# Patient Record
Sex: FEMALE | Race: WHITE | NOT HISPANIC OR LATINO | Employment: PART TIME | URBAN - METROPOLITAN AREA
[De-identification: names, ages, dates, MRNs, and addresses within clinical notes are randomized per-mention and may not be internally consistent; named-entity substitution may affect disease eponyms.]

---

## 2017-08-22 ENCOUNTER — TELEPHONE (OUTPATIENT)
Dept: CARDIAC SURGERY | Facility: CLINIC | Age: 67
End: 2017-08-22

## 2017-08-22 NOTE — TELEPHONE ENCOUNTER
PT CALLED REGARDING RECALL LETTER, 1 YEAR F/U WITH DR. PENDLETON. PT WOULD LIKE APPT IN [place] AS EARLY AS POSSIBLY IN THE MORNING. VERIFIED PT'S ADDRESS, PHONE NUMBER AND INSURANCE.

## 2017-08-28 DIAGNOSIS — I65.23 CAROTID STENOSIS, ASYMPTOMATIC, BILATERAL: ICD-10-CM

## 2017-08-28 DIAGNOSIS — I65.23 CAROTID STENOSIS, BILATERAL: Primary | ICD-10-CM

## 2017-10-05 ENCOUNTER — OFFICE VISIT (OUTPATIENT)
Dept: CARDIAC SURGERY | Facility: CLINIC | Age: 67
End: 2017-10-05

## 2017-10-05 DIAGNOSIS — I65.23 BILATERAL CAROTID ARTERY STENOSIS: Primary | ICD-10-CM

## 2017-10-05 PROCEDURE — 99212 OFFICE O/P EST SF 10 MIN: CPT | Performed by: THORACIC SURGERY (CARDIOTHORACIC VASCULAR SURGERY)

## 2017-10-06 VITALS
WEIGHT: 250 LBS | DIASTOLIC BLOOD PRESSURE: 71 MMHG | BODY MASS INDEX: 39.24 KG/M2 | SYSTOLIC BLOOD PRESSURE: 156 MMHG | HEIGHT: 67 IN

## 2017-10-06 NOTE — PROGRESS NOTES
10/05/2017  Patient Information  Marcela Peter                                                                                          [address] [address]   1950  'PCP/Referring Physician'  Macie Juares MD  [phone]  No ref. provider found    Chief Complaint   Patient presents with   • Follow-up     1yr f/u w/ carotid duplex       History of Present Illness:  Mrs. Peter returns today for follow up.  She has overall been doing reasonably well since the last visit.  She denies any TIA or CVA type of symptoms.  She denies any dizziness or vertigo.    Patient Active Problem List   Diagnosis   • CAD (coronary artery disease)   • Diabetes mellitus   • Dyslipidemia   • Glaucoma   • Hypertension   • Carotid stenosis   • Bilateral carotid artery stenosis     Past Medical History:   Diagnosis Date   • Carotid stenosis    • Diabetes mellitus 8/25/2016   • Dyslipidemia 8/25/2016   • Glaucoma 8/25/2016   • Hypertension 8/25/2016     Past Surgical History:   Procedure Laterality Date   • CHOLECYSTECTOMY         Current Outpatient Prescriptions:   •  amLODIPine (NORVASC) 10 MG tablet, , Disp: , Rfl: 0  •  aspirin 325 MG tablet, Take  by mouth., Disp: , Rfl:   •  atorvastatin (LIPITOR) 40 MG tablet, , Disp: , Rfl:   •  cetirizine (ZyrTEC) 10 MG tablet, Take 10 mg by mouth daily., Disp: , Rfl:   •  citalopram (CELEXA) 40 MG tablet, Take  by mouth., Disp: , Rfl:   •  clopidogrel (PLAVIX) 75 MG tablet, Take  by mouth., Disp: , Rfl:   •  dexlansoprazole (DEXILANT) 30 MG capsule, Take  by mouth., Disp: , Rfl:   •  Exenatide ER (BYDUREON) 2 MG pen-injector, Inject  under the skin., Disp: , Rfl:   •  glipiZIDE (GLUCOTROL) 10 MG tablet, , Disp: , Rfl:   •  losartan (COZAAR) 100 MG tablet, Take  by mouth., Disp: , Rfl:   •  metFORMIN (GLUCOPHAGE) 1000 MG tablet, Take  by mouth., Disp: , Rfl:   •  montelukast (SINGULAIR) 10 MG tablet, Take  by mouth., Disp: , Rfl:   •  Vitamin D, Cholecalciferol, 1000 UNITS  capsule, Take  by mouth., Disp: , Rfl:   •  ibuprofen (ADVIL,MOTRIN) 800 MG tablet, take 1 tablet by mouth three times a day, Disp: , Rfl: 0  Allergies   Allergen Reactions   • Iodine      Social History     Social History   • Marital status:      Spouse name: N/A   • Number of children: 1   • Years of education: N/A     Occupational History   •  Retired      [employer]     Social History Main Topics   • Smoking status: Former Smoker     Packs/day: 2.00     Years: 27.00     Types: Cigarettes     Quit date: 9/2/2008   • Smokeless tobacco: Never Used   • Alcohol use No   • Drug use: No   • Sexual activity: Not on file     Other Topics Concern   • Not on file     Social History Narrative    Lives in [place], [place] with spouseDelmer     Family History   Problem Relation Age of Onset   • Cancer Mother      colon   • Stroke Father    • Diabetes Father    • Peripheral vascular disease Brother    • Diabetes Daughter      Review of Systems   Constitution: Negative for chills, fever, malaise/fatigue, night sweats and weight loss.   HENT: Negative for hearing loss, odynophagia and sore throat.    Cardiovascular: Negative for chest pain, dyspnea on exertion, leg swelling, orthopnea and palpitations.   Respiratory: Negative for cough and hemoptysis.    Endocrine: Negative for cold intolerance, heat intolerance, polydipsia, polyphagia and polyuria.   Hematologic/Lymphatic: Bruises/bleeds easily.   Skin: Negative for itching and rash.   Musculoskeletal: Negative for joint pain, joint swelling and myalgias.   Gastrointestinal: Negative for abdominal pain, constipation, diarrhea, hematemesis, hematochezia, melena, nausea and vomiting.   Genitourinary: Negative for dysuria, frequency and hematuria.   Neurological: Negative for focal weakness, headaches, numbness and seizures.   Psychiatric/Behavioral: Negative for suicidal ideas.   All other systems reviewed and are negative.    Vitals:    10/06/17 0846   BP: 156/71   BP  "Location: Right arm   Patient Position: Sitting   Weight: 250 lb (113 kg)   Height: 67\" (170.2 cm)      Physical Exam  VASCULAR:   There is a soft right bruit.  LUNGS:   Clear.  CARDIAC:   Regular rhythm and rate.    Labs/Imaging:  I reviewed her carotid duplex which looks stable with occlusion of the right side and less than 50% on the left.    Assessment/Plan:  We will see her back in one year with another carotid duplex.  She is on a statin, aspirin and Plavix.        Patient Active Problem List   Diagnosis   • CAD (coronary artery disease)   • Diabetes mellitus   • Dyslipidemia   • Glaucoma   • Hypertension   • Carotid stenosis   • Bilateral carotid artery stenosis     Signed by: Twin Mckeon M.D.    10/5/2017    CC:  MD Niki Licona transcribing on behalf of Twin Mckeon MD dictating.   "

## 2017-10-19 PROBLEM — I65.23 BILATERAL CAROTID ARTERY STENOSIS: Status: ACTIVE | Noted: 2017-10-19

## 2018-09-14 DIAGNOSIS — I65.23 CAROTID STENOSIS, BILATERAL: Primary | ICD-10-CM

## 2018-11-13 DIAGNOSIS — I65.23 CAROTID STENOSIS, BILATERAL: ICD-10-CM

## 2019-02-26 DIAGNOSIS — Z12.11 SCREENING FOR COLON CANCER: Primary | ICD-10-CM

## 2019-03-05 ENCOUNTER — OUTSIDE FACILITY SERVICE (OUTPATIENT)
Dept: GASTROENTEROLOGY | Facility: CLINIC | Age: 69
End: 2019-03-05

## 2019-03-05 PROCEDURE — G0105 COLORECTAL SCRN; HI RISK IND: HCPCS | Performed by: INTERNAL MEDICINE

## 2019-03-05 PROCEDURE — G0500 MOD SEDAT ENDO SERVICE >5YRS: HCPCS | Performed by: INTERNAL MEDICINE

## 2019-12-27 ENCOUNTER — TELEPHONE (OUTPATIENT)
Dept: CARDIAC SURGERY | Facility: CLINIC | Age: 69
End: 2019-12-27

## 2020-01-08 ENCOUNTER — TELEPHONE (OUTPATIENT)
Dept: CARDIAC SURGERY | Facility: CLINIC | Age: 70
End: 2020-01-08

## 2020-01-08 NOTE — TELEPHONE ENCOUNTER
PT RECEIVED RECALL LETTER FOR 1 YR F/U WITH [facility] IN [place]. PT VERIFIED DEMO AND INS.

## 2020-01-10 DIAGNOSIS — I65.23 BILATERAL CAROTID ARTERY STENOSIS: Primary | ICD-10-CM

## 2020-02-18 DIAGNOSIS — I65.23 BILATERAL CAROTID ARTERY STENOSIS: ICD-10-CM

## 2021-02-02 ENCOUNTER — TELEPHONE (OUTPATIENT)
Dept: CARDIAC SURGERY | Facility: CLINIC | Age: 71
End: 2021-02-02

## 2021-02-02 NOTE — TELEPHONE ENCOUNTER
PATIENT READY FOR RECALL APPOINTMENT, PATIENT'S INSURANCE, ADDRESS, AND PHONE NUMBER VERIFIED.    **PATIENT WOULD LIKE ANY TESTING DONE AT [facility]

## 2021-02-03 DIAGNOSIS — I65.23 BILATERAL CAROTID ARTERY STENOSIS: Primary | ICD-10-CM

## 2022-02-25 DIAGNOSIS — R42 DIZZINESS: ICD-10-CM

## 2022-02-25 DIAGNOSIS — I65.23 BILATERAL CAROTID ARTERY STENOSIS: Primary | ICD-10-CM

## 2022-03-17 DIAGNOSIS — R42 DIZZINESS: ICD-10-CM

## 2022-03-17 DIAGNOSIS — I65.23 BILATERAL CAROTID ARTERY STENOSIS: ICD-10-CM

## 2022-05-11 ENCOUNTER — TELEPHONE (OUTPATIENT)
Dept: CARDIAC SURGERY | Facility: CLINIC | Age: 72
End: 2022-05-11

## 2022-05-11 NOTE — TELEPHONE ENCOUNTER
Pt was scheduled to be seen in [place] did not want to drive 2 1/2 hours to get here from [place]. Pt had carotid duplex completed requested that those results be sent to her PCP. Pt V/U that this mean that the PCP Macie Juares will continue her care as monitoring her carotid duplex.  Records faxed to [facility]